# Patient Record
Sex: MALE | Race: WHITE | NOT HISPANIC OR LATINO | URBAN - METROPOLITAN AREA
[De-identification: names, ages, dates, MRNs, and addresses within clinical notes are randomized per-mention and may not be internally consistent; named-entity substitution may affect disease eponyms.]

---

## 2020-06-02 ENCOUNTER — TELEPHONE (OUTPATIENT)
Dept: DERMATOLOGY | Age: 30
End: 2020-06-02

## 2020-06-08 ENCOUNTER — APPOINTMENT (OUTPATIENT)
Dept: DERMATOLOGY | Age: 30
End: 2020-06-08

## 2023-06-06 ENCOUNTER — OFFICE VISIT (OUTPATIENT)
Dept: URGENT CARE | Facility: MEDICAL CENTER | Age: 33
End: 2023-06-06
Payer: COMMERCIAL

## 2023-06-06 VITALS
RESPIRATION RATE: 18 BRPM | TEMPERATURE: 97 F | DIASTOLIC BLOOD PRESSURE: 80 MMHG | OXYGEN SATURATION: 98 % | SYSTOLIC BLOOD PRESSURE: 132 MMHG | HEART RATE: 89 BPM

## 2023-06-06 DIAGNOSIS — S61.212A LACERATION OF RIGHT MIDDLE FINGER WITHOUT FOREIGN BODY WITHOUT DAMAGE TO NAIL, INITIAL ENCOUNTER: Primary | ICD-10-CM

## 2023-06-06 PROCEDURE — 99203 OFFICE O/P NEW LOW 30 MIN: CPT

## 2023-06-06 PROCEDURE — 12001 RPR S/N/AX/GEN/TRNK 2.5CM/<: CPT

## 2023-06-06 RX ORDER — GUSELKUMAB 100 MG/ML
INJECTION SUBCUTANEOUS
COMMUNITY
Start: 2023-05-13

## 2023-06-06 NOTE — PROGRESS NOTES
[facility]        NAME: Alberta Chacon is a 34 y o  male  : 1990    MRN: [id number]  DATE: 2023  TIME: 7:46 PM    Assessment and Plan   Laceration of right middle finger without foreign body without damage to nail, initial encounter [S61 [id number]]  1  Laceration of right middle finger without foreign body without damage to nail, initial encounter          Wound cleansed, two sutures placed  Discussed wound care and follow up for suture removal     Patient Instructions     Two sutures were placed today with bacitracin antibiotic ointment  Keep area clean, dry, and covered for 24 hours  In 24 hours, may remove bandage and wash gently with warm soapy water, pat to dry  Keep covered if working in dirty environment  Return in 10-14 days for removal  May utilize over the counter pain medications as needed  Discussed signs of infection, including but not limited to redness, warmth, drainage of pus, fever, chills - if any of these occur contact your healthcare provider  Follow up with PCP in 3-5 days  Proceed to  ER if symptoms worsen  Chief Complaint     Chief Complaint   Patient presents with   • Laceration     Cut right hand 3rd digit on metal of a keg         History of Present Illness       Patient presents for a laceration to his right middle finger that happened around 4 pm  He states he was lifting a metal beer keg down onto a pallet and a sharp area of the keg cut into the tip of his right middle finger  He is still able to move the finger without issues  No numbness or tingling  Denies blood thinners  Tetanus UTD (2020)  Review of Systems   Review of Systems   Skin: Positive for wound (right middle finger)           Current Medications       Current Outpatient Medications:   •  Tremfya subcutaneous injection, , Disp: , Rfl:     Current Allergies     Allergies as of 2023   • (No Known Allergies)            The following portions of the patient's history were "reviewed and updated as appropriate: allergies, current medications, past family history, past medical history, past social history, past surgical history and problem list      History reviewed  No pertinent past medical history  No past surgical history on file  History reviewed  No pertinent family history  Medications have been verified  Objective   /80   Pulse 89   Temp (!) 97 °F (36 1 °C) (Tympanic)   Resp 18   SpO2 98%          Physical Exam     Physical Exam  Vitals and nursing note reviewed  Constitutional:       General: He is not in acute distress  Appearance: Normal appearance  He is not ill-appearing  Cardiovascular:      Rate and Rhythm: Normal rate and regular rhythm  Pulses: Normal pulses  Heart sounds: Normal heart sounds  Pulmonary:      Effort: Pulmonary effort is normal       Breath sounds: Normal breath sounds  Skin:     Findings: Laceration (1 cm, right middle finger) present  Neurological:      Mental Status: He is alert  Laceration repair    Date/Time: 6/6/2023 6:40 PM    Performed by: Amparo Rose PA-C  Authorized by: Amparo Rose PA-C  Consent: Verbal consent obtained  Risks and benefits: risks, benefits and alternatives were discussed  Consent given by: patient  Patient understanding: patient states understanding of the procedure being performed  Patient identity confirmed: verbally with patient  Time out: Immediately prior to procedure a \"time out\" was called to verify the correct patient, procedure, equipment, support staff and site/side marked as required    Body area: upper extremity  Location details: right long finger  Laceration length: 1 cm  Foreign bodies: no foreign bodies  Tendon involvement: none  Nerve involvement: none  Vascular damage: no  Anesthesia: digital block    Anesthesia:  Local Anesthetic: lidocaine 1% without epinephrine  Anesthetic total: 5 mL    Sedation:  Patient sedated: " no        Procedure Details:  Preparation: Patient was prepped and draped in the usual sterile fashion  Irrigation solution: dermal wound cleanser  Irrigation method: spray bottle  Amount of cleaning: standard  Debridement: none  Degree of undermining: none  Skin closure: 4-0 nylon  Number of sutures: 2  Technique: simple  Approximation: close  Approximation difficulty: simple  Dressing: bacitracin ointment, band aid    Patient tolerance: patient tolerated the procedure well with no immediate complications

## 2023-06-07 NOTE — PATIENT INSTRUCTIONS
Two sutures were placed today with bacitracin antibiotic ointment  Bren Michaud Keep area clean, dry, and covered for 24 hours  In 24 hours, may remove bandage and wash gently with warm soapy water, pat to dry  Keep covered if working in dirty environment  Return in 10-14 days for removal   May utilize over the counter pain medications as needed  Discussed signs of infection, including but not limited to redness, warmth, drainage of pus, fever, chills - if any of these occur contact your healthcare provider  Follow up with PCP in 3-5 days  Proceed to  ER if symptoms worsen  Laceration   AMBULATORY CARE:   A laceration  is an injury to the skin and the soft tissue underneath it  Lacerations can happen anywhere on the body  Common symptoms include the following:   Injury or wound to skin and tissue of any shape size that looks like a cut, tear, or gash    Edges of the wound may be close together or wide apart    Pain, bleeding, bruising, or swelling    Numbness around the wound    Decreased movement in an area below the wound    Seek care immediately if:   You have heavy bleeding or bleeding that does not stop after 10 minutes of holding firm, direct pressure over the wound  Your wound opens up  Call your doctor if:   You have a fever or chills  Your laceration is red, warm, or swollen  You have red streaks on your skin coming from your wound  You have white or yellow drainage from the wound that smells bad  You have pain that gets worse, even after treatment  You have questions or concerns about your condition or care  Treatment for a laceration  includes care to stop any bleeding  Your healthcare provider will stop the bleeding by applying pressure to the wound  He or she may need to check your wound for foreign objects and clean it to decrease the chance of infection  Your laceration may be closed with stitches, staples, tissue glue, or medical strips   Ask your healthcare provider if you need a tetanus shot  Medicines: You may need any of the following:  Prescription pain medicine  may be given  Ask your healthcare provider how to take this medicine safely  Some prescription pain medicines contain acetaminophen  Do not take other medicines that contain acetaminophen without talking to your healthcare provider  Too much acetaminophen may cause liver damage  Prescription pain medicine may cause constipation  Ask your healthcare provider how to prevent or treat constipation  Antibiotics  help treat or prevent a bacterial infection  Take your medicine as directed  Contact your healthcare provider if you think your medicine is not helping or if you have side effects  Tell your provider if you are allergic to any medicine  Keep a list of the medicines, vitamins, and herbs you take  Include the amounts, and when and why you take them  Bring the list or the pill bottles to follow-up visits  Carry your medicine list with you in case of an emergency  Care for your wound as directed:   Do not get your wound wet  until your healthcare provider says it is okay  Do not soak your wound in water  Do not go swimming until your healthcare provider says it is okay  Carefully wash the wound with soap and water  Gently pat the area dry or allow it to air dry  Change your bandages  when they get wet, dirty, or after washing  Apply new, clean bandages as directed  Do not apply elastic bandages or tape too tight  Do not put powders or lotions over your incision  Apply antibiotic ointment as directed  Your healthcare provider may give you antibiotic ointment to put over your wound if you have stitches  If you have strips of tape over your incision, let them dry up and fall off on their own  If they do not fall off within 14 days, gently remove them  If you have glue over your wound, do not remove or pick at it  If your glue comes off, do not replace it with glue that you have at home      Check your wound every day for signs of infection, such as swelling, redness, or pus  Self-care:   Apply ice  on your wound for 15 to 20 minutes every hour or as directed  Use an ice pack, or put crushed ice in a plastic bag  Cover it with a towel  Ice helps prevent tissue damage and decreases swelling and pain  Use a splint as directed  A splint will decrease movement and stress on your wound  It may help it heal faster  A splint may be used for lacerations over joints or areas of your body that bend  Ask your healthcare provider how to apply and remove a splint  Decrease scarring of your wound  by applying ointments as directed  Do not apply ointments until your healthcare provider says it is okay  You may need to wait until your wound is healed  Ask which ointment to buy and how often to use it  After your wound is healed, use sunscreen over the area when you are out in the sun  You should do this for at least 6 months to 1 year after your injury  Follow up with your doctor as directed: You will need to return in 3 to 14 days to have stitches or staples removed  Write down your questions so you remember to ask them during your visits  © Copyright [employer] 2022 Information is for End User's use only and may not be sold, redistributed or otherwise used for commercial purposes  The above information is an  only  It is not intended as medical advice for individual conditions or treatments  Talk to your doctor, nurse or pharmacist before following any medical regimen to see if it is safe and effective for you